# Patient Record
Sex: MALE | URBAN - METROPOLITAN AREA
[De-identification: names, ages, dates, MRNs, and addresses within clinical notes are randomized per-mention and may not be internally consistent; named-entity substitution may affect disease eponyms.]

---

## 2017-10-11 NOTE — ASU PATIENT PROFILE, ADULT - PMH
HLD (hyperlipidemia)    Hypertension    PVD (peripheral vascular disease)    Type 2 diabetes mellitus HLD (hyperlipidemia)    Hypertension    PVD (peripheral vascular disease)    Type 2 diabetes mellitus    Vasculitis  Allergic reaction in 12/16 to [employer]- wound care both legs

## 2017-10-12 ENCOUNTER — OUTPATIENT (OUTPATIENT)
Dept: OUTPATIENT SERVICES | Facility: HOSPITAL | Age: 72
LOS: 1 days | End: 2017-10-12
Payer: MEDICARE

## 2017-10-12 ENCOUNTER — TRANSCRIPTION ENCOUNTER (OUTPATIENT)
Age: 72
End: 2017-10-12

## 2017-10-12 VITALS
OXYGEN SATURATION: 99 % | HEART RATE: 77 BPM | RESPIRATION RATE: 17 BRPM | DIASTOLIC BLOOD PRESSURE: 75 MMHG | SYSTOLIC BLOOD PRESSURE: 133 MMHG

## 2017-10-12 VITALS
HEART RATE: 87 BPM | TEMPERATURE: 98 F | WEIGHT: 286.6 LBS | SYSTOLIC BLOOD PRESSURE: 133 MMHG | DIASTOLIC BLOOD PRESSURE: 70 MMHG | HEIGHT: 74 IN | OXYGEN SATURATION: 99 % | RESPIRATION RATE: 20 BRPM

## 2017-10-12 DIAGNOSIS — H40.52X3 GLAUCOMA SECONDARY TO OTHER EYE DISORDERS, LEFT EYE, SEVERE STAGE: ICD-10-CM

## 2017-10-12 DIAGNOSIS — M86.9 OSTEOMYELITIS, UNSPECIFIED: Chronic | ICD-10-CM

## 2017-10-12 DIAGNOSIS — Z98.49 CATARACT EXTRACTION STATUS, UNSPECIFIED EYE: Chronic | ICD-10-CM

## 2017-10-12 DIAGNOSIS — Z98.890 OTHER SPECIFIED POSTPROCEDURAL STATES: Chronic | ICD-10-CM

## 2017-10-12 LAB — GLUCOSE BLDC GLUCOMTR-MCNC: 150 MG/DL — HIGH (ref 70–99)

## 2017-10-12 PROCEDURE — C1783: CPT

## 2017-10-12 PROCEDURE — C1762: CPT

## 2017-10-12 PROCEDURE — 82962 GLUCOSE BLOOD TEST: CPT

## 2017-10-12 PROCEDURE — 66180 AQUEOUS SHUNT EYE W/GRAFT: CPT | Mod: LT

## 2017-10-12 NOTE — ASU DISCHARGE PLAN (ADULT/PEDIATRIC). - NOTIFY
Fever greater than 101/Persistent Nausea and Vomiting/Inability to Tolerate Liquids or Foods/Bleeding that does not stop/Swelling that continues

## 2017-10-12 NOTE — ASU DISCHARGE PLAN (ADULT/PEDIATRIC). - SPECIAL INSTRUCTIONS
Your eye patch will remain on overnight. Your doctor will remove it at your appointment tomorrow and instruct you on what drops to take at that time. Continue drops as usual in the other eye. Bring the eye kit and all of your other eye drops to the office for each visit. You may experience some itching or scratchiness following surgery. This is normal and is usually relieved with Tylenol which can be taken 650mg by mouth every 4-6 hours for pain. Avoid Aspirin or Motrin. If you experience persistent decrease in vision, pain, excessive bleeding , temperature above 101, persistent nausea or vomiting contact your surgeon at [phone].

## 2018-01-10 PROBLEM — Z00.00 ENCOUNTER FOR PREVENTIVE HEALTH EXAMINATION: Status: ACTIVE | Noted: 2018-01-10

## 2018-01-11 ENCOUNTER — TRANSCRIPTION ENCOUNTER (OUTPATIENT)
Age: 73
End: 2018-01-11

## 2018-01-11 ENCOUNTER — APPOINTMENT (OUTPATIENT)
Dept: ORTHOPEDIC SURGERY | Facility: CLINIC | Age: 73
End: 2018-01-11
Payer: MEDICARE

## 2018-01-11 VITALS
HEART RATE: 104 BPM | DIASTOLIC BLOOD PRESSURE: 64 MMHG | SYSTOLIC BLOOD PRESSURE: 119 MMHG | HEIGHT: 77 IN | WEIGHT: 275 LBS | BODY MASS INDEX: 32.47 KG/M2

## 2018-01-11 DIAGNOSIS — Z86.39 PERSONAL HISTORY OF OTHER ENDOCRINE, NUTRITIONAL AND METABOLIC DISEASE: ICD-10-CM

## 2018-01-11 PROCEDURE — 99203 OFFICE O/P NEW LOW 30 MIN: CPT

## 2018-01-11 PROCEDURE — 73110 X-RAY EXAM OF WRIST: CPT | Mod: 26,LT

## 2018-01-11 RX ORDER — LISINOPRIL 10 MG/1
10 TABLET ORAL
Qty: 90 | Refills: 0 | Status: ACTIVE | COMMUNITY
Start: 2017-05-03

## 2018-01-11 RX ORDER — METFORMIN HYDROCHLORIDE 1000 MG/1
1000 TABLET, COATED ORAL
Qty: 180 | Refills: 0 | Status: ACTIVE | COMMUNITY
Start: 2017-05-03

## 2018-01-15 ENCOUNTER — APPOINTMENT (OUTPATIENT)
Dept: RHEUMATOLOGY | Facility: CLINIC | Age: 73
End: 2018-01-15

## 2018-01-15 ENCOUNTER — APPOINTMENT (OUTPATIENT)
Dept: ORTHOPEDIC SURGERY | Facility: CLINIC | Age: 73
End: 2018-01-15
Payer: MEDICARE

## 2018-01-15 VITALS — WEIGHT: 277 LBS | HEIGHT: 77 IN | BODY MASS INDEX: 32.71 KG/M2

## 2018-01-15 PROCEDURE — 99214 OFFICE O/P EST MOD 30 MIN: CPT | Mod: 25

## 2018-01-15 PROCEDURE — 20605 DRAIN/INJ JOINT/BURSA W/O US: CPT | Mod: LT

## 2018-01-18 ENCOUNTER — APPOINTMENT (OUTPATIENT)
Dept: ORTHOPEDIC SURGERY | Facility: CLINIC | Age: 73
End: 2018-01-18
Payer: MEDICARE

## 2018-01-18 VITALS
DIASTOLIC BLOOD PRESSURE: 72 MMHG | HEART RATE: 98 BPM | SYSTOLIC BLOOD PRESSURE: 122 MMHG | HEIGHT: 77 IN | BODY MASS INDEX: 32.71 KG/M2 | WEIGHT: 277 LBS

## 2018-01-18 PROCEDURE — 99213 OFFICE O/P EST LOW 20 MIN: CPT

## 2018-01-19 ENCOUNTER — APPOINTMENT (OUTPATIENT)
Dept: ORTHOPEDIC SURGERY | Facility: CLINIC | Age: 73
End: 2018-01-19
Payer: MEDICARE

## 2018-01-22 ENCOUNTER — APPOINTMENT (OUTPATIENT)
Dept: ORTHOPEDIC SURGERY | Facility: CLINIC | Age: 73
End: 2018-01-22
Payer: MEDICARE

## 2018-01-22 VITALS — WEIGHT: 277 LBS | HEIGHT: 77 IN | BODY MASS INDEX: 32.71 KG/M2

## 2018-01-22 DIAGNOSIS — M77.8 OTHER ENTHESOPATHIES, NOT ELSEWHERE CLASSIFIED: ICD-10-CM

## 2018-01-22 PROCEDURE — 20605 DRAIN/INJ JOINT/BURSA W/O US: CPT | Mod: LT

## 2018-01-22 PROCEDURE — 99213 OFFICE O/P EST LOW 20 MIN: CPT | Mod: 25

## 2018-01-24 ENCOUNTER — CHART COPY (OUTPATIENT)
Age: 73
End: 2018-01-24

## 2018-02-02 ENCOUNTER — CHART COPY (OUTPATIENT)
Age: 73
End: 2018-02-02

## 2018-02-08 ENCOUNTER — CHART COPY (OUTPATIENT)
Age: 73
End: 2018-02-08

## 2018-02-12 ENCOUNTER — APPOINTMENT (OUTPATIENT)
Dept: ORTHOPEDIC SURGERY | Facility: CLINIC | Age: 73
End: 2018-02-12
Payer: MEDICARE

## 2018-02-12 VITALS
HEIGHT: 77 IN | DIASTOLIC BLOOD PRESSURE: 74 MMHG | WEIGHT: 275 LBS | SYSTOLIC BLOOD PRESSURE: 128 MMHG | BODY MASS INDEX: 32.47 KG/M2 | HEART RATE: 88 BPM

## 2018-02-12 PROCEDURE — 99213 OFFICE O/P EST LOW 20 MIN: CPT

## 2018-02-21 ENCOUNTER — MOBILE ON CALL (OUTPATIENT)
Age: 73
End: 2018-02-21

## 2018-02-22 ENCOUNTER — CHART COPY (OUTPATIENT)
Age: 73
End: 2018-02-22

## 2018-02-26 ENCOUNTER — APPOINTMENT (OUTPATIENT)
Dept: ORTHOPEDIC SURGERY | Facility: CLINIC | Age: 73
End: 2018-02-26
Payer: MEDICARE

## 2018-02-26 VITALS — WEIGHT: 255 LBS | HEIGHT: 77 IN | BODY MASS INDEX: 30.11 KG/M2

## 2018-02-26 VITALS — BODY MASS INDEX: 32.47 KG/M2 | HEIGHT: 77 IN | WEIGHT: 275 LBS

## 2018-02-26 PROCEDURE — 73130 X-RAY EXAM OF HAND: CPT | Mod: 26,LT

## 2018-02-26 PROCEDURE — 73110 X-RAY EXAM OF WRIST: CPT | Mod: 26,LT

## 2018-02-26 PROCEDURE — 99213 OFFICE O/P EST LOW 20 MIN: CPT

## 2018-04-09 ENCOUNTER — APPOINTMENT (OUTPATIENT)
Dept: ORTHOPEDIC SURGERY | Facility: CLINIC | Age: 73
End: 2018-04-09
Payer: MEDICARE

## 2018-04-09 VITALS — BODY MASS INDEX: 30.11 KG/M2 | WEIGHT: 255 LBS | HEIGHT: 77 IN

## 2018-04-09 PROCEDURE — 99213 OFFICE O/P EST LOW 20 MIN: CPT

## 2018-04-09 PROCEDURE — 73110 X-RAY EXAM OF WRIST: CPT | Mod: 26,LT

## 2018-04-09 RX ORDER — INSULIN GLARGINE 100 [IU]/ML
100 INJECTION, SOLUTION SUBCUTANEOUS
Qty: 45 | Refills: 0 | Status: DISCONTINUED | COMMUNITY
Start: 2017-05-13 | End: 2018-04-09

## 2018-05-21 ENCOUNTER — APPOINTMENT (OUTPATIENT)
Dept: ORTHOPEDIC SURGERY | Facility: CLINIC | Age: 73
End: 2018-05-21
Payer: MEDICARE

## 2018-05-21 VITALS — HEIGHT: 77 IN | WEIGHT: 256 LBS | BODY MASS INDEX: 30.23 KG/M2

## 2018-05-21 PROCEDURE — 73130 X-RAY EXAM OF HAND: CPT | Mod: 26,LT

## 2018-05-21 PROCEDURE — 99213 OFFICE O/P EST LOW 20 MIN: CPT

## 2018-05-21 PROCEDURE — 73110 X-RAY EXAM OF WRIST: CPT | Mod: 26,LT

## 2018-07-09 ENCOUNTER — CHART COPY (OUTPATIENT)
Age: 73
End: 2018-07-09

## 2018-07-13 PROBLEM — M00.9: Status: ACTIVE | Noted: 2018-02-09

## 2018-07-13 PROBLEM — M10.9 GOUT, ARTHROPATHY: Status: ACTIVE | Noted: 2018-01-11

## 2018-07-16 ENCOUNTER — APPOINTMENT (OUTPATIENT)
Dept: ORTHOPEDIC SURGERY | Facility: CLINIC | Age: 73
End: 2018-07-16
Payer: MEDICARE

## 2018-07-16 VITALS — BODY MASS INDEX: 30.23 KG/M2 | HEIGHT: 77 IN | WEIGHT: 256 LBS

## 2018-07-16 DIAGNOSIS — M10.9 GOUT, UNSPECIFIED: ICD-10-CM

## 2018-07-16 DIAGNOSIS — M00.9 PYOGENIC ARTHRITIS, UNSPECIFIED: ICD-10-CM

## 2018-07-16 PROCEDURE — 73130 X-RAY EXAM OF HAND: CPT | Mod: 26,LT

## 2018-07-16 PROCEDURE — 73110 X-RAY EXAM OF WRIST: CPT | Mod: 26,LT

## 2018-07-16 PROCEDURE — 99214 OFFICE O/P EST MOD 30 MIN: CPT

## 2018-08-01 PROBLEM — I73.9 PERIPHERAL VASCULAR DISEASE, UNSPECIFIED: Chronic | Status: ACTIVE | Noted: 2017-10-12

## 2018-08-01 PROBLEM — E78.5 HYPERLIPIDEMIA, UNSPECIFIED: Chronic | Status: ACTIVE | Noted: 2017-10-12

## 2018-08-01 PROBLEM — I77.6 ARTERITIS, UNSPECIFIED: Chronic | Status: ACTIVE | Noted: 2017-10-12

## 2018-08-01 PROBLEM — E11.9 TYPE 2 DIABETES MELLITUS WITHOUT COMPLICATIONS: Chronic | Status: ACTIVE | Noted: 2017-10-12

## 2018-08-01 PROBLEM — I10 ESSENTIAL (PRIMARY) HYPERTENSION: Chronic | Status: ACTIVE | Noted: 2017-10-12

## 2018-08-07 ENCOUNTER — APPOINTMENT (OUTPATIENT)
Dept: NEUROSURGERY | Facility: CLINIC | Age: 73
End: 2018-08-07
Payer: MEDICARE

## 2018-08-07 VITALS
WEIGHT: 256 LBS | BODY MASS INDEX: 30.23 KG/M2 | HEART RATE: 80 BPM | HEIGHT: 77 IN | SYSTOLIC BLOOD PRESSURE: 130 MMHG | DIASTOLIC BLOOD PRESSURE: 70 MMHG

## 2018-08-07 PROCEDURE — 99203 OFFICE O/P NEW LOW 30 MIN: CPT

## 2020-04-28 ENCOUNTER — TRANSCRIPTION ENCOUNTER (OUTPATIENT)
Age: 75
End: 2020-04-28

## 2021-04-07 ENCOUNTER — APPOINTMENT (OUTPATIENT)
Dept: PULMONOLOGY | Facility: CLINIC | Age: 76
End: 2021-04-07
Payer: MEDICARE

## 2021-04-07 VITALS
SYSTOLIC BLOOD PRESSURE: 130 MMHG | WEIGHT: 265 LBS | BODY MASS INDEX: 31.29 KG/M2 | HEART RATE: 76 BPM | HEIGHT: 77 IN | DIASTOLIC BLOOD PRESSURE: 70 MMHG | OXYGEN SATURATION: 98 %

## 2021-04-07 DIAGNOSIS — Z23 ENCOUNTER FOR IMMUNIZATION: ICD-10-CM

## 2021-04-07 PROCEDURE — 99204 OFFICE O/P NEW MOD 45 MIN: CPT

## 2021-04-07 RX ORDER — LIDOCAINE HYDROCHLORIDE 20 MG/ML
2 JELLY TOPICAL
Qty: 60 | Refills: 0 | Status: COMPLETED | COMMUNITY
Start: 2017-08-22 | End: 2021-04-07

## 2021-04-07 RX ORDER — PREDNISOLONE ACETATE 10 MG/ML
1 SUSPENSION/ DROPS OPHTHALMIC
Qty: 10 | Refills: 0 | Status: COMPLETED | COMMUNITY
Start: 2018-01-09 | End: 2021-04-07

## 2021-04-07 RX ORDER — OFLOXACIN 3 MG/ML
0.3 SOLUTION/ DROPS OPHTHALMIC
Qty: 5 | Refills: 0 | Status: DISCONTINUED | COMMUNITY
Start: 2017-10-02 | End: 2021-04-07

## 2021-04-07 RX ORDER — PEN NEEDLE, DIABETIC 29 G X1/2"
31G X 8 MM NEEDLE, DISPOSABLE MISCELLANEOUS
Qty: 450 | Refills: 0 | Status: COMPLETED | COMMUNITY
Start: 2017-09-14 | End: 2021-04-07

## 2021-04-07 RX ORDER — CEPHALEXIN 500 MG/1
500 CAPSULE ORAL
Qty: 30 | Refills: 0 | Status: COMPLETED | COMMUNITY
Start: 2018-01-11 | End: 2021-04-07

## 2021-04-07 RX ORDER — SULFAMETHOXAZOLE AND TRIMETHOPRIM 800; 160 MG/1; MG/1
800-160 TABLET ORAL
Qty: 84 | Refills: 0 | Status: DISCONTINUED | COMMUNITY
Start: 2017-11-09 | End: 2021-04-07

## 2021-04-07 RX ORDER — TRIAMCINOLONE ACETONIDE 1 MG/G
0.1 OINTMENT TOPICAL
Qty: 454 | Refills: 0 | Status: DISCONTINUED | COMMUNITY
Start: 2017-07-26 | End: 2021-04-07

## 2021-04-07 RX ORDER — METHYLPREDNISOLONE 4 MG/1
4 TABLET ORAL
Qty: 21 | Refills: 0 | Status: DISCONTINUED | COMMUNITY
Start: 2017-12-22 | End: 2021-04-07

## 2021-04-07 RX ORDER — DICLOFENAC SODIUM 10 MG/G
1 GEL TOPICAL
Qty: 100 | Refills: 0 | Status: DISCONTINUED | COMMUNITY
Start: 2017-12-27 | End: 2021-04-07

## 2021-04-07 RX ORDER — CYCLOPENTOLATE HYDROCHLORIDE 10 MG/ML
1 SOLUTION OPHTHALMIC
Qty: 30 | Refills: 0 | Status: DISCONTINUED | COMMUNITY
Start: 2017-11-27 | End: 2021-04-07

## 2021-04-07 RX ORDER — INSULIN GLARGINE 100 [IU]/ML
100 INJECTION, SOLUTION SUBCUTANEOUS
Refills: 0 | Status: ACTIVE | COMMUNITY

## 2021-04-07 RX ORDER — INSULIN LISPRO 100 [IU]/ML
100 INJECTION, SOLUTION INTRAVENOUS; SUBCUTANEOUS
Qty: 15 | Refills: 0 | Status: COMPLETED | COMMUNITY
Start: 2017-05-08 | End: 2021-04-07

## 2021-04-07 RX ORDER — FLUOCINONIDE 0.5 MG/G
0.05 CREAM TOPICAL
Qty: 60 | Refills: 0 | Status: COMPLETED | COMMUNITY
Start: 2016-11-29 | End: 2021-04-07

## 2021-04-07 RX ORDER — INSULIN DETEMIR 100 [IU]/ML
100 INJECTION, SOLUTION SUBCUTANEOUS
Refills: 0 | Status: COMPLETED | COMMUNITY
End: 2021-04-07

## 2021-04-07 RX ORDER — DIFLUPREDNATE 0.5 MG/ML
0.05 EMULSION OPHTHALMIC
Qty: 5 | Refills: 0 | Status: COMPLETED | COMMUNITY
Start: 2017-10-02 | End: 2021-04-07

## 2021-04-07 RX ORDER — INDOMETHACIN 25 MG/1
25 CAPSULE ORAL 3 TIMES DAILY
Qty: 30 | Refills: 0 | Status: COMPLETED | COMMUNITY
Start: 2018-01-11 | End: 2021-04-07

## 2021-04-07 RX ORDER — ACETAZOLAMIDE 500 MG/1
500 CAPSULE ORAL
Qty: 20 | Refills: 0 | Status: COMPLETED | COMMUNITY
Start: 2017-09-12 | End: 2021-04-07

## 2021-04-07 RX ORDER — LEVOFLOXACIN 500 MG/1
500 TABLET, FILM COATED ORAL DAILY
Qty: 20 | Refills: 0 | Status: DISCONTINUED | COMMUNITY
Start: 2018-01-15 | End: 2021-04-07

## 2021-04-07 RX ORDER — LEVOFLOXACIN 500 MG/1
500 TABLET, FILM COATED ORAL
Qty: 40 | Refills: 0 | Status: DISCONTINUED | COMMUNITY
Start: 2017-10-19 | End: 2021-04-07

## 2021-04-07 RX ORDER — HYDROCODONE BITARTRATE AND ACETAMINOPHEN 5; 325 MG/1; MG/1
5-325 TABLET ORAL
Qty: 30 | Refills: 0 | Status: COMPLETED | COMMUNITY
Start: 2018-01-08 | End: 2021-04-07

## 2021-04-07 RX ORDER — CIPROFLOXACIN HYDROCHLORIDE 750 MG/1
750 TABLET, FILM COATED ORAL
Qty: 28 | Refills: 0 | Status: DISCONTINUED | COMMUNITY
Start: 2017-11-08 | End: 2021-04-07

## 2021-04-07 RX ORDER — CLOPIDOGREL BISULFATE 75 MG/1
75 TABLET, FILM COATED ORAL
Qty: 60 | Refills: 0 | Status: DISCONTINUED | COMMUNITY
Start: 2017-11-24 | End: 2021-04-07

## 2021-04-07 RX ORDER — BLOOD SUGAR DIAGNOSTIC
STRIP MISCELLANEOUS
Qty: 200 | Refills: 0 | Status: COMPLETED | COMMUNITY
Start: 2017-06-16 | End: 2021-04-07

## 2021-04-07 RX ORDER — COLLAGENASE SANTYL 250 [ARB'U]/G
250 OINTMENT TOPICAL
Qty: 90 | Refills: 0 | Status: COMPLETED | COMMUNITY
Start: 2017-03-21 | End: 2021-04-07

## 2021-04-07 RX ORDER — SULFAMETHOXAZOLE AND TRIMETHOPRIM 400; 80 MG/1; MG/1
400-80 TABLET ORAL
Qty: 28 | Refills: 0 | Status: COMPLETED | COMMUNITY
Start: 2017-12-04 | End: 2021-04-07

## 2021-04-07 RX ORDER — ATORVASTATIN CALCIUM 20 MG/1
20 TABLET, FILM COATED ORAL
Qty: 90 | Refills: 0 | Status: COMPLETED | COMMUNITY
Start: 2017-05-04 | End: 2021-04-07

## 2021-04-07 RX ORDER — CIPROFLOXACIN 3 MG/ML
0.3 SOLUTION OPHTHALMIC
Qty: 5 | Refills: 0 | Status: DISCONTINUED | COMMUNITY
Start: 2017-08-29 | End: 2021-04-07

## 2021-04-07 RX ORDER — RIFAMPIN 300 MG/1
300 CAPSULE ORAL
Qty: 28 | Refills: 0 | Status: COMPLETED | COMMUNITY
Start: 2017-11-09 | End: 2021-04-07

## 2021-04-07 RX ORDER — TACROLIMUS 1 MG/G
0.1 OINTMENT TOPICAL
Qty: 30 | Refills: 0 | Status: DISCONTINUED | COMMUNITY
Start: 2016-12-08 | End: 2021-04-07

## 2021-04-07 RX ORDER — LEVOFLOXACIN 750 MG/1
750 TABLET, FILM COATED ORAL
Qty: 14 | Refills: 0 | Status: COMPLETED | COMMUNITY
Start: 2017-10-04 | End: 2021-04-07

## 2021-04-07 NOTE — HISTORY OF PRESENT ILLNESS
[TextBox_4] : 75-year-old male with a history of diabetes, diabetic neuropathy, and nephropathy, seen today for findings on CAT scan. 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 He underwent a CAT scan of the chest as part of the workup for intermittent cough and was found to have a below findings. His cough is described as located at the base of his neck, associated with postnasal drip, greatest in the morning and in the supine position. He is confined to wheelchair secondary to the above and has no complaints of shortness of breath. History is negative for edema, chest pains, fevers, chills.\par  [ESS] : 4

## 2021-04-07 NOTE — DISCUSSION/SUMMARY
[FreeTextEntry1] : 75-year-old male, seen today for the above. Patient complains of cough, most likely on the basis of chronic sinus disease. Findings on CAT scan secondary to prior inflammatory process and most likely represents chronic scar. Relationship to the [facility] is unknown. He has been advised to institute daily, sinus rinse with saline. Followup pulmonary function tests are pending.

## 2021-04-07 NOTE — CONSULT LETTER
[Dear  ___] : Dear  [unfilled], [Consult Letter:] : I had the pleasure of evaluating your patient, [unfilled]. [Please see my note below.] : Please see my note below. [Consult Closing:] : Thank you very much for allowing me to participate in the care of this patient.  If you have any questions, please do not hesitate to contact me. [Sincerely,] : Sincerely, [FreeTextEntry3] : Marcial Salas MD FCCP\par Pulmonary/Critical Care/Sleep Medicine\par Department of Internal Medicine\par \par [facility]\par

## 2021-04-07 NOTE — PHYSICAL EXAM
[No Acute Distress] : no acute distress [Normal Oropharynx] : normal oropharynx [Normal Appearance] : normal appearance [No Neck Mass] : no neck mass [Normal Rate/Rhythm] : normal rate/rhythm [Normal S1, S2] : normal s1, s2 [No Murmurs] : no murmurs [No Resp Distress] : no resp distress [Clear to Auscultation Bilaterally] : clear to auscultation bilaterally [No Abnormalities] : no abnormalities [Benign] : benign [Normal Gait] : normal gait [Normal Color/ Pigmentation] : normal color/ pigmentation [No Focal Deficits] : no focal deficits [Oriented x3] : oriented x3 [Normal Affect] : normal affect [IV] : Mallampati Class: IV [Neck Circumference: ___] : neck circumference: [unfilled] [TextBox_2] : Wheelchair, obese [TextBox_105] : brianna ankle brace

## 2021-04-19 DIAGNOSIS — Z01.818 ENCOUNTER FOR OTHER PREPROCEDURAL EXAMINATION: ICD-10-CM

## 2021-04-23 ENCOUNTER — APPOINTMENT (OUTPATIENT)
Dept: DISASTER EMERGENCY | Facility: CLINIC | Age: 76
End: 2021-04-23

## 2021-04-27 ENCOUNTER — APPOINTMENT (OUTPATIENT)
Dept: PULMONOLOGY | Facility: CLINIC | Age: 76
End: 2021-04-27
Payer: MEDICARE

## 2021-04-27 VITALS
SYSTOLIC BLOOD PRESSURE: 124 MMHG | HEART RATE: 72 BPM | OXYGEN SATURATION: 96 % | RESPIRATION RATE: 16 BRPM | DIASTOLIC BLOOD PRESSURE: 64 MMHG

## 2021-04-27 VITALS — TEMPERATURE: 98.1 F | HEIGHT: 77 IN | BODY MASS INDEX: 31.29 KG/M2 | WEIGHT: 265 LBS

## 2021-04-27 PROCEDURE — 94010 BREATHING CAPACITY TEST: CPT

## 2021-04-27 PROCEDURE — 85018 HEMOGLOBIN: CPT | Mod: QW

## 2021-04-27 PROCEDURE — 94729 DIFFUSING CAPACITY: CPT

## 2021-04-27 PROCEDURE — 99214 OFFICE O/P EST MOD 30 MIN: CPT | Mod: 25

## 2021-04-27 PROCEDURE — 94727 GAS DIL/WSHOT DETER LNG VOL: CPT

## 2021-04-27 RX ORDER — AMOXICILLIN AND CLAVULANATE POTASSIUM 875; 125 MG/1; MG/1
875-125 TABLET, COATED ORAL
Qty: 30 | Refills: 0 | Status: DISCONTINUED | COMMUNITY
Start: 2020-12-08 | End: 2021-04-27

## 2021-04-27 RX ORDER — AMOXICILLIN AND CLAVULANATE POTASSIUM 500; 125 MG/1; MG/1
500-125 TABLET, FILM COATED ORAL
Qty: 28 | Refills: 0 | Status: DISCONTINUED | COMMUNITY
Start: 2021-02-18 | End: 2021-04-27

## 2021-04-27 RX ORDER — INDOMETHACIN 50 MG/1
50 CAPSULE ORAL
Qty: 30 | Refills: 0 | Status: ACTIVE | COMMUNITY
Start: 2021-01-07

## 2021-04-27 RX ORDER — FLUCONAZOLE 150 MG/1
150 TABLET ORAL
Qty: 30 | Refills: 0 | Status: DISCONTINUED | COMMUNITY
Start: 2021-01-20 | End: 2021-04-27

## 2021-04-27 RX ORDER — FLUOROURACIL 50 MG/G
5 CREAM TOPICAL
Qty: 40 | Refills: 0 | Status: DISCONTINUED | COMMUNITY
Start: 2020-11-23 | End: 2021-04-27

## 2021-04-27 RX ORDER — LANCETS
EACH MISCELLANEOUS
Qty: 100 | Refills: 0 | Status: ACTIVE | COMMUNITY
Start: 2020-11-17

## 2021-04-27 NOTE — DISCUSSION/SUMMARY
[FreeTextEntry1] : 75-year-old male, seen today for complaints of intermittent cough, and chronic sinus disease. CAT scan is consistent with previous injury, probable chronic scar with a questionable relationship to the [employer] attack. His pulmonary function tests demonstrate evidence of chest wall restriction with a normal diffusion capacity. Followup CAT scan in 3 months from the date of his previous CT with subsequent followup here

## 2021-04-27 NOTE — PHYSICAL EXAM
[No Acute Distress] : no acute distress [Normal Oropharynx] : normal oropharynx [IV] : Mallampati Class: IV [Normal Appearance] : normal appearance [Neck Circumference: ___] : neck circumference: [unfilled] [No Neck Mass] : no neck mass [Normal Rate/Rhythm] : normal rate/rhythm [Normal S1, S2] : normal s1, s2 [No Murmurs] : no murmurs [No Resp Distress] : no resp distress [Clear to Auscultation Bilaterally] : clear to auscultation bilaterally [No Abnormalities] : no abnormalities [Benign] : benign [Normal Gait] : normal gait [Normal Color/ Pigmentation] : normal color/ pigmentation [No Focal Deficits] : no focal deficits [Oriented x3] : oriented x3 [Normal Affect] : normal affect [TextBox_2] : Wheelchair, obese [TextBox_105] : brianna ankle brace

## 2021-04-27 NOTE — CONSULT LETTER
[Dear  ___] : Dear  [unfilled], [Consult Letter:] : I had the pleasure of evaluating your patient, [unfilled]. [Please see my note below.] : Please see my note below. [Consult Closing:] : Thank you very much for allowing me to participate in the care of this patient.  If you have any questions, please do not hesitate to contact me. [Sincerely,] : Sincerely, [FreeTextEntry3] : Marcial Salas MD FCCP\par Pulmonary/Critical Care/Sleep Medicine\par Department of Internal Medicine\par \par [facility]\par

## 2021-05-11 ENCOUNTER — OUTPATIENT (OUTPATIENT)
Dept: OUTPATIENT SERVICES | Facility: HOSPITAL | Age: 76
LOS: 1 days | End: 2021-05-11
Payer: MEDICARE

## 2021-05-11 ENCOUNTER — APPOINTMENT (OUTPATIENT)
Dept: MRI IMAGING | Facility: CLINIC | Age: 76
End: 2021-05-11
Payer: MEDICARE

## 2021-05-11 DIAGNOSIS — Z98.890 OTHER SPECIFIED POSTPROCEDURAL STATES: Chronic | ICD-10-CM

## 2021-05-11 DIAGNOSIS — Z98.49 CATARACT EXTRACTION STATUS, UNSPECIFIED EYE: Chronic | ICD-10-CM

## 2021-05-11 DIAGNOSIS — Z00.00 ENCOUNTER FOR GENERAL ADULT MEDICAL EXAMINATION WITHOUT ABNORMAL FINDINGS: ICD-10-CM

## 2021-05-11 DIAGNOSIS — M86.9 OSTEOMYELITIS, UNSPECIFIED: Chronic | ICD-10-CM

## 2021-05-11 PROCEDURE — A9585: CPT

## 2021-05-11 PROCEDURE — 73720 MRI LWR EXTREMITY W/O&W/DYE: CPT

## 2021-05-11 PROCEDURE — 73720 MRI LWR EXTREMITY W/O&W/DYE: CPT | Mod: 26,RT,MH

## 2021-06-23 ENCOUNTER — APPOINTMENT (OUTPATIENT)
Dept: CT IMAGING | Facility: CLINIC | Age: 76
End: 2021-06-23
Payer: MEDICARE

## 2021-06-23 ENCOUNTER — OUTPATIENT (OUTPATIENT)
Dept: OUTPATIENT SERVICES | Facility: HOSPITAL | Age: 76
LOS: 1 days | End: 2021-06-23
Payer: MEDICARE

## 2021-06-23 DIAGNOSIS — Z98.49 CATARACT EXTRACTION STATUS, UNSPECIFIED EYE: Chronic | ICD-10-CM

## 2021-06-23 DIAGNOSIS — M86.9 OSTEOMYELITIS, UNSPECIFIED: Chronic | ICD-10-CM

## 2021-06-23 DIAGNOSIS — Z98.890 OTHER SPECIFIED POSTPROCEDURAL STATES: Chronic | ICD-10-CM

## 2021-06-23 DIAGNOSIS — R91.8 OTHER NONSPECIFIC ABNORMAL FINDING OF LUNG FIELD: ICD-10-CM

## 2021-06-23 PROCEDURE — G1004: CPT

## 2021-06-23 PROCEDURE — 71250 CT THORAX DX C-: CPT | Mod: 26,ME

## 2021-06-23 PROCEDURE — 71250 CT THORAX DX C-: CPT

## 2021-06-29 ENCOUNTER — APPOINTMENT (OUTPATIENT)
Dept: PULMONOLOGY | Facility: CLINIC | Age: 76
End: 2021-06-29
Payer: MEDICARE

## 2021-06-29 VITALS
HEART RATE: 74 BPM | WEIGHT: 265 LBS | BODY MASS INDEX: 31.29 KG/M2 | SYSTOLIC BLOOD PRESSURE: 140 MMHG | HEIGHT: 77 IN | OXYGEN SATURATION: 94 % | DIASTOLIC BLOOD PRESSURE: 80 MMHG

## 2021-06-29 PROCEDURE — 99215 OFFICE O/P EST HI 40 MIN: CPT

## 2021-06-29 RX ORDER — TAMSULOSIN HYDROCHLORIDE 0.4 MG/1
0.4 CAPSULE ORAL
Qty: 90 | Refills: 0 | Status: ACTIVE | COMMUNITY
Start: 2021-05-18

## 2021-06-29 RX ORDER — SODIUM CHLORIDE/SODIUM BICARB
KIT NASAL
Qty: 1 | Refills: 0 | Status: ACTIVE | COMMUNITY
Start: 2021-06-29 | End: 1900-01-01

## 2021-06-29 RX ORDER — ALBUTEROL SULFATE 2.5 MG/3ML
(2.5 MG/3ML) SOLUTION RESPIRATORY (INHALATION)
Qty: 1 | Refills: 5 | Status: ACTIVE | COMMUNITY
Start: 2021-06-29 | End: 1900-01-01

## 2021-06-29 RX ORDER — AZELASTINE HYDROCHLORIDE AND FLUTICASONE PROPIONATE 137; 50 UG/1; UG/1
137-50 SPRAY, METERED NASAL
Qty: 1 | Refills: 5 | Status: ACTIVE | COMMUNITY
Start: 2021-06-29 | End: 1900-01-01

## 2021-06-29 NOTE — HISTORY OF PRESENT ILLNESS
[TextBox_4] : 75 male with a history of diabetes, diabetic nephropathy, and neuropathy, seen today for followup of complaints of cough. Patient continues to note cough when lying in a supine position and in the morning. This is associated with a postnasal drip. He denies any fevers, chills, chest pains, palpitations, lightheadedness, dizziness.

## 2021-06-29 NOTE — CONSULT LETTER
[Dear  ___] : Dear  [unfilled], [Consult Letter:] : I had the pleasure of evaluating your patient, [unfilled]. [Please see my note below.] : Please see my note below. [Consult Closing:] : Thank you very much for allowing me to participate in the care of this patient.  If you have any questions, please do not hesitate to contact me. [Sincerely,] : Sincerely, [FreeTextEntry3] : Marcial Salas MD FCCP\par Pulmonary/Critical Care/Sleep Medicine\par Department of Internal Medicine\par \par [facility]\par

## 2021-06-29 NOTE — DISCUSSION/SUMMARY
[FreeTextEntry1] : 76-year-old male, seen today for complaints of intermittent cough with chronic sinusitis. His chest CT is consistent with mild cylindrical bronchiectasis, which may be consistent with prior injury from [place] attack. There has been clearance of previous infiltrate with improved clarity of his clinical bronchiectasis. I feel that his course is also complicated by chronic sinusitis and retained secretions.

## 2021-09-29 ENCOUNTER — APPOINTMENT (OUTPATIENT)
Dept: PULMONOLOGY | Facility: CLINIC | Age: 76
End: 2021-09-29
Payer: MEDICARE

## 2021-09-29 VITALS
BODY MASS INDEX: 32.47 KG/M2 | HEART RATE: 83 BPM | WEIGHT: 275 LBS | OXYGEN SATURATION: 97 % | RESPIRATION RATE: 16 BRPM | SYSTOLIC BLOOD PRESSURE: 128 MMHG | DIASTOLIC BLOOD PRESSURE: 68 MMHG | HEIGHT: 77 IN

## 2021-09-29 DIAGNOSIS — J47.9 BRONCHIECTASIS, UNCOMPLICATED: ICD-10-CM

## 2021-09-29 DIAGNOSIS — R05 COUGH: ICD-10-CM

## 2021-09-29 PROCEDURE — 99214 OFFICE O/P EST MOD 30 MIN: CPT

## 2021-09-29 NOTE — CONSULT LETTER
[Dear  ___] : Dear  [unfilled], [Consult Letter:] : I had the pleasure of evaluating your patient, [unfilled]. [Please see my note below.] : Please see my note below. [Consult Closing:] : Thank you very much for allowing me to participate in the care of this patient.  If you have any questions, please do not hesitate to contact me. [Sincerely,] : Sincerely, [FreeTextEntry3] : Marcial Salas MD FCCP\par Pulmonary/Critical Care/Sleep Medicine\par Department of Internal Medicine\par \par [facility]\par

## 2021-09-29 NOTE — HISTORY OF PRESENT ILLNESS
[Never] : never [TextBox_4] : 75yo  male with a history of diabetes, diabetic nephropathy, and neuropathy, seen today for followup of complaints of cough. Patient continues to note cough when lying in a supine position and in the morning. This is associated with a postnasal drip. He denies any fevers, chills, chest pains, palpitations, lightheadedness, dizziness.

## 2021-09-29 NOTE — DISCUSSION/SUMMARY
[FreeTextEntry1] : 76-year-old male, seen today for complaints of intermittent cough with chronic sinusitis. His chest CT is consistent with mild cylindrical bronchiectasis, which may be consistent with prior injury from [place]. There has been clearance of previous infiltrate with improved clarity of his clinical bronchiectasis. I feel that his course is also complicated by chronic sinusitis and retained secretions.\par \par Encouraged to to use nebulizer once a day

## 2023-06-13 NOTE — ASU PATIENT PROFILE, ADULT - VISION (WITH CORRECTIVE LENSES IF THE PATIENT USUALLY WEARS THEM):
See treatment section for initial evaluation.    Jessica Kevin PT, DPT     
Partially impaired: cannot see medication labels or newsprint, but can see obstacles in path, and the surrounding layout; can count fingers at arm's length

## 2023-06-22 ENCOUNTER — APPOINTMENT (OUTPATIENT)
Dept: ORTHOPEDIC SURGERY | Facility: CLINIC | Age: 78
End: 2023-06-22
Payer: MEDICARE

## 2023-06-22 PROCEDURE — 20550 NJX 1 TENDON SHEATH/LIGAMENT: CPT | Mod: RT

## 2023-06-22 PROCEDURE — 99203 OFFICE O/P NEW LOW 30 MIN: CPT | Mod: 25

## 2023-06-22 RX ORDER — BETAMETHA AC,SOD PHOS/WATER/PF 6 MG/ML
6 (3-3) VIAL (ML) INJECTION
Qty: 1 | Refills: 0 | Status: COMPLETED | OUTPATIENT
Start: 2023-06-22

## 2023-06-22 RX ORDER — LIDOCAINE HYDROCHLORIDE 10 MG/ML
1 INJECTION, SOLUTION INFILTRATION; PERINEURAL
Refills: 0 | Status: COMPLETED | OUTPATIENT
Start: 2023-06-22

## 2023-06-22 RX ADMIN — BETAMETHASONE ACETATE AND BETAMETHASONE SODIUM PHOSPHATE 1 MG/ML: 3; 3 INJECTION, SUSPENSION INTRA-ARTICULAR; INTRALESIONAL; INTRAMUSCULAR; SOFT TISSUE at 00:00

## 2023-06-22 RX ADMIN — LIDOCAINE HYDROCHLORIDE 0.5 %: 10 INJECTION, SOLUTION INFILTRATION; PERINEURAL at 00:00

## 2023-06-22 NOTE — DISCUSSION/SUMMARY
[FreeTextEntry1] : He has findings consistent with a right ring trigger finger.\par \par I had a discussion with the patient regarding today's visit, the prognosis of this diagnosis, and treatment recommendations and options. At this time, I recommended a a cortisone injection.  He understands as a diabetic, this will likely elevate his blood sugars in the short-term.\par \par The patient has agreed to the above plan of management and has expressed full understanding.  All questions were fully answered to the patient's satisfaction.\par \par My cumulative time spent on this visit included: Preparation for the visit, review of the medical records, review of pertinent diagnostic studies, examination and counseling of the patient on the above diagnosis, treatment plan and prognosis, orders of diagnostic tests, medication and/or appropriate procedures and documentation in the medical records of today's visit.		\par

## 2023-06-22 NOTE — PHYSICAL EXAM
[de-identified] : -Constitutional: This is a male  in no obvious distress.  He was accompanied by his wife today.\par -Psych: Patient is alert and oriented to person, place and time.  Patient has a normal mood and affect.\par -Respiratory:  Patient exhibits no evidence of shortness of breath or difficulty breathing.\par -Skin: No rashes, lesions, or other abnormalities are noted in the upper extremities.\par ---\par \par Examination of his right hand demonstrates mild tenderness without swelling along the A1 pulley of the ring finger.  There is triggering.  There is no triggering of the other digits.  He has a well-healed extended carpal tunnel release incision.  He has intact sensation to light touch throughout the digits.		\par

## 2023-06-22 NOTE — ADDENDUM
[FreeTextEntry1] : I, Mavis Dash, acted solely as a scribe for Dr. Cortes on this date on 06/22/2023.		\par

## 2023-06-22 NOTE — END OF VISIT
[FreeTextEntry3] : This note was written by Mavis Dash on 06/22/2023 acting solely as a scribe for Dr. Rohit Cortes.\par  \par All medical record entries made by the Scribe were at my, Dr. Rohit Cortes, direction and personally dictated by me on 06/22/2023. I have personally reviewed the chart and agree that the record accurately reflects my personal performance of the history, physical exam, assessment and plan.		\par

## 2023-06-22 NOTE — HISTORY OF PRESENT ILLNESS
[Right] : right hand dominant [FreeTextEntry1] : He comes in today for evaluation of his right hand pain that started 2 months ago. His pain level is 7/10 and he experiences intermittent numbness at night. His  strength is good. Furthermore, he experiences mild stiffness, swelling and redness in his right hand currently.\par \par I have seen him in the past regarding his left wrist.  He was last seen in this regard almost 5 years ago.\par \par PMHx: Includes Diabetes Mellitus \par \par He was accompanied by his wife in the office today.\par

## 2023-06-22 NOTE — PROCEDURE
[FreeTextEntry1] : -After a discussion of risks and benefits, the patient agreed to proceed with a cortisone injection.  \par -Side: Right\par -Finger: ring trigger finger\par -Medications: 0.5 cc of 1% Lidocaine and 1 cc of Celestone Soluspan, 6mg/cc, using sterile technique.\par -Patient tolerated the procedure well, without complications.\par -Patient was told that the symptoms may worsen for a day or two, and should then begin to improve. \par -Instructions: Patient was instructed on activity modification for the next several days.\par -Follow-up: Within 4 weeks to assess response to the injection.\par \par \par 	\par

## 2023-10-19 ENCOUNTER — APPOINTMENT (OUTPATIENT)
Dept: ORTHOPEDIC SURGERY | Facility: CLINIC | Age: 78
End: 2023-10-19
Payer: MEDICARE

## 2023-10-19 PROCEDURE — 99214 OFFICE O/P EST MOD 30 MIN: CPT | Mod: 25

## 2023-10-19 PROCEDURE — 20551 NJX 1 TENDON ORIGIN/INSJ: CPT | Mod: F8

## 2024-05-23 ENCOUNTER — NON-APPOINTMENT (OUTPATIENT)
Age: 79
End: 2024-05-23

## 2024-05-28 PROBLEM — M65.341 TRIGGER RING FINGER OF RIGHT HAND: Status: ACTIVE | Noted: 2023-06-22

## 2024-06-03 ENCOUNTER — APPOINTMENT (OUTPATIENT)
Dept: ORTHOPEDIC SURGERY | Facility: CLINIC | Age: 79
End: 2024-06-03
Payer: MEDICARE

## 2024-06-03 DIAGNOSIS — M19.032 PRIMARY OSTEOARTHRITIS, LEFT WRIST: ICD-10-CM

## 2024-06-03 DIAGNOSIS — M65.341 TRIGGER FINGER, RIGHT RING FINGER: ICD-10-CM

## 2024-06-03 PROCEDURE — 20550 NJX 1 TENDON SHEATH/LIGAMENT: CPT | Mod: RT

## 2024-06-03 PROCEDURE — 99213 OFFICE O/P EST LOW 20 MIN: CPT | Mod: 25

## 2024-06-03 RX ORDER — BETAMETHA AC,SOD PHOS/WATER/PF 6 MG/ML
6 (3-3) VIAL (ML) INJECTION
Qty: 1 | Refills: 0 | Status: COMPLETED | OUTPATIENT
Start: 2024-06-03

## 2024-06-03 RX ORDER — LIDOCAINE HYDROCHLORIDE 10 MG/ML
1 INJECTION, SOLUTION INFILTRATION; PERINEURAL
Refills: 0 | Status: COMPLETED | OUTPATIENT
Start: 2024-06-03

## 2024-06-03 RX ADMIN — BETAMETHASONE ACETATE AND BETAMETHASONE SODIUM PHOSPHATE 1 MG/ML: 3; 3 INJECTION, SUSPENSION INTRA-ARTICULAR; INTRALESIONAL; INTRAMUSCULAR; SOFT TISSUE at 00:00

## 2024-06-03 RX ADMIN — LIDOCAINE HYDROCHLORIDE 0.5 %: 10 INJECTION, SOLUTION EPIDURAL; INFILTRATION; INTRACAUDAL; PERINEURAL at 00:00

## 2024-06-03 NOTE — END OF VISIT
[FreeTextEntry3] : This note was written by José Miguel Collazo on 06/03/2024 acting solely as a scribe for Dr. Rohit Cortes.   All medical record entries made by the Scribe were at my, Dr. Rohit Cortes, direction and personally dictated by me on 06/03/2024. I have personally reviewed the chart and agree that the record accurately reflects my personal performance of the history, physical exam, assessment and plan.

## 2024-06-03 NOTE — HISTORY OF PRESENT ILLNESS
[FreeTextEntry1] : 7 1/2 months status post right ring finger trigger cortisone injection #2.  He returns today complaining of returned symptoms at his previously injected right ring finger trigger finger. He is having pain which is worse at night, locking, and clicking. His previous cortisone injection provided relief until about one month ago. He is here today seeking another cortisone injection.  PMHx: Includes Diabetes Mellitus   He was accompanied by his wife today.

## 2024-06-03 NOTE — ADDENDUM
[FreeTextEntry1] : I, José Miguel Collazo, acted solely as a scribe for Dr. Cortes on this date on 06/03/2024.

## 2024-06-03 NOTE — DISCUSSION/SUMMARY
[FreeTextEntry1] : I had a discussion regarding today's visit, the diagnosis and treatment recommendations and options.  We also discussed changes since the last visit.  At this time, he agreed to proceed with a third cortisone injection into his right middle finger trigger finger. He opted to proceed. He was informed that, as a diabetic, he may notice a temporary spike in blood sugar. He will follow up according to symptoms.  He and his wife understand that as this is his third injection, if his symptoms recur, then he may require surgical release in the future.  The patient has agreed to the above plan of management and has expressed full understanding.  All questions were fully answered to the patient's satisfaction.  My cumulative time spent on today's visit included: Preparation for the visit, review of the medical records, review of pertinent diagnostic studies, examination and counseling of the patient on the above diagnosis, treatment plan and prognosis, orders of diagnostic tests, medications and/or appropriate procedures and documentation in the medical records of today's visit.

## 2024-06-03 NOTE — PHYSICAL EXAM
[de-identified] : -Constitutional: This is a male  in no obvious distress.  He was accompanied by his wife today.  He comes in a wheelchair. -Psych: Patient is alert and oriented to person, place and time.  Patient has a normal mood and affect. -Respiratory:  Patient exhibits no evidence of shortness of breath or difficulty breathing.  ---  Examination of his right hand demonstrates mild tenderness without swelling along the A1 pulley of the ring finger.  There is triggering.  There is no triggering of the other digits.  He has a well-healed extended carpal tunnel release incision.  He has a small laceration along the dorsal aspect of the index finger proximal phalanx from a recent injury.  He has intact sensation to light touch throughout the digits.

## 2024-07-17 ENCOUNTER — NON-APPOINTMENT (OUTPATIENT)
Age: 79
End: 2024-07-17

## 2024-07-29 ENCOUNTER — APPOINTMENT (OUTPATIENT)
Dept: ORTHOPEDIC SURGERY | Facility: CLINIC | Age: 79
End: 2024-07-29
Payer: MEDICARE

## 2024-07-29 DIAGNOSIS — M65.341 TRIGGER FINGER, RIGHT RING FINGER: ICD-10-CM

## 2024-07-29 DIAGNOSIS — G56.01 CARPAL TUNNEL SYNDROME, RIGHT UPPER LIMB: ICD-10-CM

## 2024-07-29 PROCEDURE — 99214 OFFICE O/P EST MOD 30 MIN: CPT

## 2024-07-29 NOTE — PHYSICAL EXAM
[de-identified] : -Constitutional: This is a male  in no obvious distress.  He was accompanied by his wife today.  He comes in a wheelchair. -Psych: Patient is alert and oriented to person, place and time.  Patient has a normal mood and affect. -Respiratory:  Patient exhibits no evidence of shortness of breath or difficulty breathing.  ---  Examination of his right hand demonstrates no tenderness or swelling along the A1 pulley of the ring finger.  There is possibly mild clicking but no residual triggering.  There is no triggering of the other digits.  He has a well-healed extended carpal tunnel release incision.   He has intact sensation to light touch throughout the digits.  However, he does have complaints of numbness and tingling in his right hand at night.

## 2024-07-29 NOTE — PHYSICAL EXAM
[de-identified] : -Constitutional: This is a male  in no obvious distress.  He was accompanied by his wife today.  He comes in a wheelchair. -Psych: Patient is alert and oriented to person, place and time.  Patient has a normal mood and affect. -Respiratory:  Patient exhibits no evidence of shortness of breath or difficulty breathing.  ---  Examination of his right hand demonstrates no tenderness or swelling along the A1 pulley of the ring finger.  There is possibly mild clicking but no residual triggering.  There is no triggering of the other digits.  He has a well-healed extended carpal tunnel release incision.   He has intact sensation to light touch throughout the digits.  However, he does have complaints of numbness and tingling in his right hand at night.

## 2024-07-29 NOTE — ADDENDUM
[FreeTextEntry1] :  I, Geo Moss, acted solely as a scribe for Dr. Cortes on this date on 07/29/2024.

## 2024-07-29 NOTE — HISTORY OF PRESENT ILLNESS
[FreeTextEntry1] : 8 weeks status post right ring finger trigger cortisone injection #3.  He returns today for numbness in the right hand. He reports his right ring trigger finger symptoms have subsided. He rates his pain as a 0/10. He reports numbness at night. He denies swelling or clicking.  PMHx: Includes Diabetes Mellitus   He was accompanied by his wife today.

## 2024-07-29 NOTE — END OF VISIT
[FreeTextEntry3] : This note was written by Geo Moss on 07/29/2024 acting solely as a scribe for Dr. Rohit Cortes.   All medical record entries made by the Scribe were at my, Dr. Rohit Cortes, direction and personally dictated by me on 07/29/2024. I have personally reviewed the chart and agree that the record accurately reflects my personal performance of the history, physical exam, assessment and plan.

## 2024-07-29 NOTE — DISCUSSION/SUMMARY
[FreeTextEntry1] : I had a discussion regarding today's visit, the diagnosis and treatment recommendations and options.  We also discussed changes since the last visit.   With regard to the right ring finger trigger finger, as his symptoms have nearly resolved, I have recommended observation.  With regard to the numbness and tingling in his right hand at night, I did tell him that he likely has developed recurrence of his carpal tunnel syndrome symptoms.  I recommended use of carpal tunnel splint at night.  If his symptoms worsen at either the right carpal tunnel or at the right ring finger trigger finger, then he will either call the office or return to discuss further treatment recommendations.  The patient has agreed to the above plan of management and has expressed full understanding.  All questions were fully answered to the patient's satisfaction.  My cumulative time spent on today's visit was greater than 30 minutes and included: Preparation for the visit, review of the medical records, review of pertinent diagnostic studies, examination and counseling of the patient on the above diagnosis, treatment plan and prognosis, orders of diagnostic tests, medications and/or appropriate procedures and documentation in the medical records of today's visit.

## 2024-11-13 ENCOUNTER — NON-APPOINTMENT (OUTPATIENT)
Age: 79
End: 2024-11-13

## 2024-11-18 ENCOUNTER — APPOINTMENT (OUTPATIENT)
Dept: ORTHOPEDIC SURGERY | Facility: CLINIC | Age: 79
End: 2024-11-18
Payer: MEDICARE

## 2024-11-18 DIAGNOSIS — G56.01 CARPAL TUNNEL SYNDROME, RIGHT UPPER LIMB: ICD-10-CM

## 2024-11-18 DIAGNOSIS — M65.341 TRIGGER FINGER, RIGHT RING FINGER: ICD-10-CM

## 2024-11-18 DIAGNOSIS — M65.331 TRIGGER FINGER, RIGHT MIDDLE FINGER: ICD-10-CM

## 2024-11-18 PROCEDURE — 99214 OFFICE O/P EST MOD 30 MIN: CPT
